# Patient Record
Sex: MALE | Race: BLACK OR AFRICAN AMERICAN
[De-identification: names, ages, dates, MRNs, and addresses within clinical notes are randomized per-mention and may not be internally consistent; named-entity substitution may affect disease eponyms.]

---

## 2017-06-26 ENCOUNTER — HOSPITAL ENCOUNTER (EMERGENCY)
Dept: HOSPITAL 62 - ER | Age: 62
Discharge: HOME | End: 2017-06-26
Payer: OTHER GOVERNMENT

## 2017-06-26 VITALS — DIASTOLIC BLOOD PRESSURE: 107 MMHG | SYSTOLIC BLOOD PRESSURE: 166 MMHG

## 2017-06-26 DIAGNOSIS — F17.210: ICD-10-CM

## 2017-06-26 DIAGNOSIS — Z71.6: ICD-10-CM

## 2017-06-26 DIAGNOSIS — I10: ICD-10-CM

## 2017-06-26 DIAGNOSIS — Z86.73: ICD-10-CM

## 2017-06-26 DIAGNOSIS — M51.16: Primary | ICD-10-CM

## 2017-06-26 DIAGNOSIS — L02.212: ICD-10-CM

## 2017-06-26 PROCEDURE — 87205 SMEAR GRAM STAIN: CPT

## 2017-06-26 PROCEDURE — 87077 CULTURE AEROBIC IDENTIFY: CPT

## 2017-06-26 PROCEDURE — 0H96XZZ DRAINAGE OF BACK SKIN, EXTERNAL APPROACH: ICD-10-PCS | Performed by: NURSE PRACTITIONER

## 2017-06-26 PROCEDURE — 99284 EMERGENCY DEPT VISIT MOD MDM: CPT

## 2017-06-26 PROCEDURE — 87070 CULTURE OTHR SPECIMN AEROBIC: CPT

## 2017-06-26 PROCEDURE — 72110 X-RAY EXAM L-2 SPINE 4/>VWS: CPT

## 2017-06-26 PROCEDURE — 87075 CULTR BACTERIA EXCEPT BLOOD: CPT

## 2017-06-26 NOTE — RADIOLOGY REPORT (SQ)
EXAM DESCRIPTION:  L SPINE WHOLE



COMPLETED DATE/TIME:  6/26/2017 12:21 pm



REASON FOR STUDY:  pain left hip and back



COMPARISON:  None.



NUMBER OF VIEWS:  Five views including obliques.



TECHNIQUE:  AP, lateral, oblique, and sacral radiographic images acquired of the lumbar spine.



LIMITATIONS:  None.



FINDINGS:  MINERALIZATION: Normal.

SEGMENTATION: L5 may represent a transitional vertebra.  There is sacralization of L5.

ALIGNMENT: There is grade 1 anterolisthesis of L4 on L5.

VERTEBRAE: Maintained height.  No fracture or worrisome bone lesion.

DISCS: There is narrowing of the L4-5 and L5-S1 disc spaces.

POSTERIOR ELEMENTS: Pedicles and facets are intact.  No pars defect or posterior arch defects.  Hyper
trophic facet joints are present at L4-5 and L5-S1.

HARDWARE: None in the spine.

PARASPINAL SOFT TISSUES: Normal.

PELVIS: Intact as visualized. No fractures or worrisome bone lesions. SI joints intact.

OTHER: No other significant finding.



IMPRESSION:  1.  L5 appears to represent a transitional vertebra.

2.  There is grade 1 anterolisthesis of L4 on L5.

3.  There are degenerative disc changes and facet arthropathy as described.



TECHNICAL DOCUMENTATION:  JOB ID:  [id number]

 2011 [employer]- All Rights Reserved

## 2017-06-26 NOTE — ER DOCUMENT REPORT
ED General





- General


Chief Complaint: Leg Pain


Stated Complaint: LEFT SIDE SHOULDER,LEG PAIN


Time Seen by Provider: 06/26/17 11:19


Mode of Arrival: Ambulatory


Information source: Patient


Notes: 


61-year-old male presents to ED for pain in his left lower back and down his 

left leg about a month progressively getting worse.  States he is having 

difficulty sitting upright due to the pain.  [facility] and forgot to tell them that he had this new pain.  He also has abscess 

to the posterior left upper back just medial to his shoulder which is causing 

him pain to his left shoulder and neck.  He states it has been there for 2-3 

week.  He states he did not tell the [facility] about this either when he was 

saw him last.  This patient has a previous history of a stroke in the left side 

in 2012 high blood pressure high cholesterol.


TRAVEL OUTSIDE OF THE [place] IN LAST 30 DAYS: No





- HPI


Onset: Other - A month or more


Onset/Duration: Gradual, Persistent


Quality of pain: Achy, Sharp, Throbbing


Severity: Severe


Pain Level: 5


Associated symptoms: Other - Left shoulder neck and arm pain left lower back 

and hip pain abscess to the left upper back just below the shoulder


Exacerbated by: Sitting, Movement, Walking


Relieved by: Denies


Similar symptoms previously: Yes


Recently seen / treated by doctor: Yes





- Related Data


Allergies/Adverse Reactions: 


 





No Known Allergies Allergy (Verified 06/26/17 10:22)


 











Past Medical History





- General


Information source: Patient





- Social History


Smoking Status: Current Every Day Smoker


Cigarette use (# per day): Yes - Smokes 2-3 cigars a day


Chew tobacco use (# tins/day): No


Smoking Education Provided: Yes - Less than 2 minutes


Frequency of alcohol use: None


Drug Abuse: None


Occupation: None


Lives with: Alone


Family History: Arthritis, DM, Hyperlipidemia, Hypertension.  denies: CAD, COPD

, CVA, Malignancy, Thyroid Disfunction


Patient has suicidal ideation: No


Patient has homicidal ideation: No





- Past Medical History


Cardiac Medical History: Reports: Hx Hypercholesterolemia, Hx Hypertension


Pulmonary Medical History: Reports: None


Neurological Medical History: Reports: Hx Cerebrovascular Accident - 2012, Hx 

Seizures - childhood


Renal/ Medical History: Reports: None


Malignancy Medical History: Reports None


GI Medical History: Reports: None


Musculoskeltal Medical History: Reports Hx Arthritis, Reports Hx Muscle 

Weakness - Left


Skin Medical History: Reports None


Psychiatric Medical History: Reports: None


Traumatic Medical History: Reports: None


Infectious Medical History: Reports: None


Surgical Hx: Negative


Past Surgical History: Reports: None





- Immunizations


Hx Diphtheria, Pertussis, Tetanus Vaccination: Yes





Review of Systems





- Review of Systems


Constitutional: No symptoms reported


EENT: No symptoms reported


Cardiovascular: No symptoms reported


Respiratory: No symptoms reported


Gastrointestinal: No symptoms reported


Genitourinary: No symptoms reported


Male Genitourinary: No symptoms reported


Musculoskeletal: Back pain, Joint pain - Hip, Muscle pain, Other - Left 

shoulder and left side of neck pain due to abscess just below the left shoulder 

and upper back


Skin: Other - Abscess left upper back


Hematologic/Lymphatic: No symptoms reported


Neurological/Psychological: No symptoms reported





Physical Exam





- Vital signs


Vitals: 


 











Temp Pulse Resp BP Pulse Ox


 


 98.3 F   73   20   166/107 H  99 


 


 06/26/17 10:23  06/26/17 10:23  06/26/17 10:23  06/26/17 10:23  06/26/17 10:23











Interpretation: Normal





- General


General appearance: Appears well, Alert





- HEENT


Head: Normocephalic, Atraumatic


Eyes: Normal


Pupils: PERRL





- Respiratory


Respiratory status: No respiratory distress


Chest status: Nontender


Breath sounds: Normal


Chest palpation: Normal





- Cardiovascular


Rhythm: Regular


Heart sounds: Normal auscultation


Murmur: No





- Abdominal


Inspection: Normal


Distension: No distension


Bowel sounds: Normal


Tenderness: Nontender


Organomegaly: No organomegaly





- Back


Back: Normal, Tender - Left side or back.  No: Deformity/step-off, CVA 

tenderness, Vertebra tenderness, Scars, Scoliosis, Wounds





- Extremities


General upper extremity: Normal inspection, Nontender, Normal color, Normal ROM

, Normal temperature


General lower extremity: Normal inspection, Normal color, Normal temperature, 

Normal weight bearing.  No: Frank's sign


Thigh: Tender.  No: Dislocation





- Neurological


Neuro grossly intact: Yes


Cognition: Normal


Orientation: AAOx4


Sebastián Coma Scale Eye Opening: Spontaneous


Mulberry Coma Scale Verbal: Oriented


Sebastián Coma Scale Motor: Obeys Commands


M[place]berry Coma Scale Total: 15


Speech: Normal


Motor strength normal: LUE, RUE, LLE, RLE


Sensory: Normal





- Psychological


Associated symptoms: Normal affect, Normal mood





- Skin


Skin Temperature: Warm


Skin Moisture: Dry


Skin Color: Normal


Skin irregularity: Abscess - Left upper back just below the left shoulder





Course





- Re-evaluation


Re-evalutation: 





06/26/17 13:59


Discussed x-rays with Dr. Briceño and patient.  [facility].  He was also treated for an abscess to the upper left back which was 

I&D.  Patient started on Bactrim and Keflex as well as given a Norco dispense 

pack for his discomfort.  [facility] concern is [facility] and 

his blood pressure.





- Vital Signs


Vital signs: 


 











Temp Pulse Resp BP Pulse Ox


 


 98.3 F   73   20   166/107 H  99 


 


 06/26/17 10:23  06/26/17 10:23  06/26/17 10:23  06/26/17 10:23  06/26/17 10:23














- Diagnostic Test


Radiology reviewed: Image reviewed, Reports reviewed





Procedures





- Incision and Drainage


  ** Left Upper Back


Type: Simple


Anesthetic type: 1% Lidocaine


mL's of anesthetic: 5


Blade size: 11


I&D procedure: Sterile dressing applied, Other - surgical scrub


Incision Method: Incision made by scalpel


Amount/type of drainage: Large amount of purulent drainage





Discharge





- Discharge


Clinical Impression: 


 abscess left upper back





Degenerative disc disease


Qualifiers:


 Spinal region: lumbar Qualified Code(s): M51.36 - Other intervertebral disc 

degeneration, lumbar region





Low back pain


Qualifiers:


 Chronicity: chronic Back pain laterality: left Sciatica presence: with 

sciatica Sciatica laterality: sciatica of left side Qualified Code(s): M54.42 - 

Lumbago with sciatica, left side; G89.29 - Other chronic pain





Condition: Stable


Disposition: HOME, SELF-CARE


Additional Instructions: 


LOW BACK PAIN:





     Three out of every four people will have an episode of disabling back pain 

during their lifetime.  Most commonly the pain is due to straining of the 

muscles and ligaments in the low back.


     Usual treatment includes: 


(1) Rest on a firm surface.  Avoid lying on your stomach.  


(2) Ice pack the painful area.  After a few days, gentle heat may be used 

intermittently to relax the area, or ice packs can be continued.  


(3) Medication may be needed -- muscle relaxers and antiinflammatory medicines 

are commonly used.  


(4) As the back improves, exercises are prescribed to strengthen the back and 

abdominal muscles.


     Your doctor will advise you on the proper care for your back at each stage 

in your recovery.  You may be better in a few days -- or healing may take 

several weeks.


     If new symptoms of a "herniated disc" (radiation of pain, numbness, or 

tingling down the back of the leg or weakness in the leg) occur, you should be 

re-examined.  Further testing may be necessary.





CELLULITIS:





     You have an infection of your skin and underlying soft tissues called 

cellulitis.  This is due to bacteria, which can enter through any break in the 

skin, or even through an irritated hair follicle.  Untreated, cellulitis will 

usually worsen.


     Antibiotics are required.  Usually, warm packs or warm soaks, and 

elevation of the infected area are recommended.  You should start getting 

better within 24 to 36 hours.


     Most infections respond quickly to the right medication. Follow-up care is 

important, however, to check for abscess (boil) formation, unsuspected foreign 

body, or resistant infection.


     If you develop fever, chills, or if the area of infection is becoming 

rapidly more swollen or painful, call the doctor at once.





Cephalexin





     The antibiotic you've been prescribed is a member of the cephalosporin 

class.  This type of antibiotic covers a wide variety of infections, including 

those of the skin, lungs, and urinary tract. It's useful for staph infections.


     This antibiotic is slightly similar to the penicillin family. In rare cases

, a person who is allergic to penicillin will also be allergic to this 

medication.  If you have had a severe allergic reaction to penicillin, and have 

not taken this antibiotic since that time, notify your doctor.


     Antibiotics which cover many germs ("broad spectrum" antibiotics) are more 

likely to cause diarrhea or "yeast" infections.  Women prone to vaginal yeast 

problems may suffer an attack after taking this antibiotic.  In infants, oral 

thrush (white spots "stuck" on the cheek) or yeast diaper rash may result.  See 

your doctor if these problems occur.  Call at once if you develop itching, hives

, shortness of breath, or lightheadedness.











TRIMETHOPRIM-SULFA:


     You have been given a prescription for trimethoprim-sulfa (TMS, Septra, 

Bactrim).  This is a combination antibiotic of the sulfa class, often used for 

urinary tract infections, middle ear infections, bronchitis, shigella 

intestinal infection, and Pneumocystis pneumonia.


     TMS is usually well-tolerated.  Occasional side effects include nausea and 

decreased appetite.  Septra is not recommended for infants less than two months 

of age.  Do not take this medication if you have experienced severe side 

effects or allergy to sulfa medicine.


     You should stop this medicine at once and contact your physician if you 

develop any rash, joint pain, shortness of breath, bruising, or jaundice (

yellow color in the skin), or if you develop any other new or unusual symptoms.











ORAL NARCOTIC MEDICATION:


     You have been given a prescription for pain control.  This medication is a 

narcotic.  It's best taken with food, as nausea can result if taken on an empty 

stomach.


     Don't operate machinery or drive within six hours of taking this 

medication.  Do not combine this medicine with alcohol, or with any medication 

which can cause sedation (such as cold tablets or sleeping pills) unless you 

get permission from the physician.


     Narcotics tend to cause constipation.  If possible, drink plenty of fluids 

and eat a diet high in fiber and fruits.





     Please be aware that prescription narcotics also have the potential for 

abuse.  People become addicted to these medications because of the general 

sense of wellbeing that they induce.  This feeling along with a significant 

reduction in tension, anxiety, and aggression provides a stimulating seductive 

quality to these drugs.  Once your pain is under control, we encourage you to 

discard your unused narcotics.





ICE PACKS:


     Apply ice packs frequently against the painful area.  Many different 

schedules are recommended, such as "20 minutes on, 20 minutes off" or "one hour 

ice, two hours rest."  If you need to work, you may need to go longer between 

ice treatments.  You should plan to have the area ice packed AT LEAST one 

fourth of the time.


     The ice should be applied over the wrap, tape, or splint, or over a layer 

of cloth -- not directly against the skin.  Some ice bags have a built-in cloth 

and can be put directly on the skin.





WARM PACKS:


     After approximately two days, apply gentle heat (such as a heating pad or 

hot water bottle) for about 20 to 30 minutes about every two hours -- at least 

four times daily.  Warmth and elevation will help you make a more rapid recovery

, and will ease the pain considerably.


     Do not use HOT heat, and never apply heat for longer than 30 minutes.  The 

continuous heat can invisibly damage skin and muscles -- even when no burn is 

seen on the surface.  Damaged muscles can make you MORE sore.








FOLLOW-UP CARE:


If you have been referred to a physician for follow-up care, call the physician

s office for an appointment as you were instructed or within the next two days.

  If you experience worsening or a significant change in your symptoms, notify 

the physician immediately or return to the Emergency Department at any time for 

re-evaluation.








[facility] your chronic [facility] pain, blood pressure, and 

your abscess.


Prescriptions: 


Cephalexin Monohydrate [Keflex 500 mg Capsule] 500 mg PO QID #20 capsule


Sulfamethoxazole/Trimethoprim [Septra-Ds 800-160 mg Tablet] 1 tab PO BID #14 

tablet


Forms:  Elevated Blood Pressure, Smoking Cessation Education

## 2017-06-26 NOTE — RADIOLOGY REPORT (SQ)
EXAM DESCRIPTION:  HIP LEFT AP/LATERAL



COMPLETED DATE/TIME:  6/26/2017 12:21 pm



REASON FOR STUDY:  pain left hip and back



COMPARISON:  None.



NUMBER OF VIEWS:  Two views.



TECHNIQUE:  AP pelvis and additional frog-leg view of the left hip.



LIMITATIONS:  None.



FINDINGS:  MINERALIZATION: Normal.

LEFT HIP: No fracture or dislocation.  No worrisome bone lesions.

RIGHT HIP: No fracture or dislocation.  No worrisome bone lesions.

PUBIS AND ISCHIUM: No fracture.

PELVIS: No fracture.

SACRUM: No fracture or dislocation. No worrisome bone lesions.

LOWER LUMBAR SPINE: Degenerative changes see the report for the lumbar spine series.

SOFT TISSUES: No findings.

OTHER: No other significant finding.



IMPRESSION:  Normal hip with lumbar degenerative changes.



TECHNICAL DOCUMENTATION:  JOB ID:  [id number]

 2011 [employer]- All Rights Reserved

## 2017-07-15 ENCOUNTER — HOSPITAL ENCOUNTER (EMERGENCY)
Dept: HOSPITAL 62 - ER | Age: 62
Discharge: HOME | End: 2017-07-15
Payer: OTHER GOVERNMENT

## 2017-07-15 VITALS — DIASTOLIC BLOOD PRESSURE: 86 MMHG | SYSTOLIC BLOOD PRESSURE: 153 MMHG

## 2017-07-15 DIAGNOSIS — I10: ICD-10-CM
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
## 2017-07-15 DIAGNOSIS — E11.9: ICD-10-CM

## 2017-07-15 DIAGNOSIS — S90.32XA: Primary | ICD-10-CM

## 2017-07-15 DIAGNOSIS — I69.354: ICD-10-CM

## 2017-07-15 PROCEDURE — 99283 EMERGENCY DEPT VISIT LOW MDM: CPT

## 2017-07-15 NOTE — RADIOLOGY REPORT (SQ)
EXAM DESCRIPTION:  FOOT LEFT COMPLETE



COMPLETED DATE/TIME:  7/15/2017 2:35 pm



REASON FOR STUDY:  foot inj slammed in door



COMPARISON:  2012.



NUMBER OF VIEWS:  Three views left foot.



LIMITATIONS:  None.



FINDINGS:  There is no acute or significant bone, joint or soft tissue abnormality.

OTHER: Osteopenic.



IMPRESSION:  No fracture appreciated.



TECHNICAL DOCUMENTATION:  JOB ID:  [id number]

## 2017-07-15 NOTE — ER DOCUMENT REPORT
ED General





- General


Chief Complaint: Foot Injury


Stated Complaint: FOOT INJURY


Notes: 


P1-year-old man with multiple comorbidities and a left-sided stroke presents 

with pain on the dorsum of his left foot for 3 weeks since he slammed the door, 

worse with walking, radiating up his leg initially associated with swelling 

which is now resolved.  He is taking nothing for the pain.


TRAVEL OUTSIDE OF THE [place] IN LAST 30 DAYS: No





- Related Data


Allergies/Adverse Reactions: 


 





No Known Allergies Allergy (Verified 07/15/17 13:34)


 











Past Medical History





- General


Information source: Patient - Reviewed





- Social History


Smoking Status: Former Smoker


Family History: Arthritis, DM, Hyperlipidemia, Hypertension.  denies: CAD, COPD

, CVA, Malignancy, Thyroid Disfunction


Patient has suicidal ideation: No


Patient has homicidal ideation: No





- Past Medical History


Cardiac Medical History: Reports: Hx Hypercholesterolemia, Hx Hypertension


Neurological Medical History: Reports: Hx Cerebrovascular Accident - 2012, Hx 

Seizures - childhood


Renal/ Medical History: Denies: Hx Peritoneal Dialysis


Musculoskeltal Medical History: Reports Hx Arthritis, Reports Hx Muscle 

Weakness - Left





- Immunizations


Hx Diphtheria, Pertussis, Tetanus Vaccination: Yes





Review of Systems





- Review of Systems


Notes: 


REVIEW OF SYSTEMS





GEN: Denies fever, chills, weight loss


ENT: Denies sore throat, nasal discharge, ear pain


EYES: Denies blurry vision, eye pain, discharge


CV: Denies chest pain, palpitations, edema


RESP: Denies cough, shortness of breath, wheezing


GI: Denies abdominal pain, nausea, vomiting, diarrhea


MSK: Denies joint pain/swelling, edema, dorsal foot pain


SKIN: Denies rash, skin lesions


LYMPH: Denies swollen glands/lymph nodes


NEURO: D old stroke left-sided weakness


PSYCH: Denies depression, suicidal or homicidal ideation








PHYSICAL EXAMINATION





General: No acute distress, well-nourished


Head: Atraumatic, normocephalic


ENT: Mouth normal, oropharynx moist, no exudates or tonsillar enlargement


Eyes: Conjunctiva normal, pupils equal, lids normal


Neck: No JVD, supple, no guarding


CVS: Normal rate, regular rhythm, no murmurs


Resp: No resp distress, equal and normal breath sounds bilaterally


GI: Nondistended, soft, no tenderness to palpation, no rebound or guarding


Ext: No deformities, no edema, normal range of motion in upper and lower ext.  

No swelling or ecchymosis of the foot with mild dorsal foot tenderness.


Back: No CVA or midline TTP


Skin: No rash, warm


Lymphatic: No lymphadeopathy noted


Neuro: Awake, alert.  Face symmetric.  GCS 15.  Left-sided contractures and 

muscle wasting upper greater than lower extremity.





Physical Exam





- Vital signs


Vitals: 


 











Temp Pulse Resp BP Pulse Ox


 


 98.4 F   70   18   141/92 H  100 


 


 07/15/17 13:34  07/15/17 13:34  07/15/17 13:34  07/15/17 13:34  07/15/17 13:34














Course





- Re-evaluation


Re-evalutation: 





07/15/17 14:20


Subacute foot pain after slamming a door.  No apparent injury.  Given patient's 

a diabetic with a stroke I will x-ray his foot to rule out fracture in a 

patient with abnormal sensorium.  He will then be given anti-inflammatory 

medication and discharged home.





- Vital Signs


Vital signs: 


 











Temp Pulse Resp BP Pulse Ox


 


 98.4 F   70   18   141/92 H  100 


 


 07/15/17 13:34  07/15/17 13:34  07/15/17 13:34  07/15/17 13:34  07/15/17 13:34














- Diagnostic Test


Radiology reviewed: Pending, Image reviewed, Reports reviewed





Discharge





- Discharge


Clinical Impression: 


Contusion of left foot


Qualifiers:


 Encounter type: initial encounter Qualified Code(s): S90.32XA - Contusion of 

left foot, initial encounter





Condition: Good


Instructions:  Contusion ([facility])


Prescriptions: 


Ibuprofen [Motrin 600 Mg Tablet] 600 mg PO TID #15 tablet

## 2017-07-21 ENCOUNTER — HOSPITAL ENCOUNTER (EMERGENCY)
Dept: HOSPITAL 62 - ER | Age: 62
Discharge: HOME | End: 2017-07-21
Payer: OTHER GOVERNMENT

## 2017-07-21 VITALS — DIASTOLIC BLOOD PRESSURE: 101 MMHG | SYSTOLIC BLOOD PRESSURE: 185 MMHG

## 2017-07-21 DIAGNOSIS — Z86.73: ICD-10-CM

## 2017-07-21 DIAGNOSIS — M25.562: ICD-10-CM

## 2017-07-21 DIAGNOSIS — M25.462: Primary | ICD-10-CM

## 2017-07-21 DIAGNOSIS — E78.00: ICD-10-CM

## 2017-07-21 DIAGNOSIS — F17.210: ICD-10-CM

## 2017-07-21 DIAGNOSIS — M79.1: ICD-10-CM

## 2017-07-21 DIAGNOSIS — I10: ICD-10-CM

## 2017-07-21 PROCEDURE — 99283 EMERGENCY DEPT VISIT LOW MDM: CPT

## 2017-07-21 PROCEDURE — 96372 THER/PROPH/DIAG INJ SC/IM: CPT

## 2017-07-21 NOTE — ER DOCUMENT REPORT
ED General





- General


Chief Complaint: Knee Pain


Stated Complaint: KNOT ON LEFT KNEE


Time Seen by Provider: 07/21/17 12:38


Mode of Arrival: Ambulatory


Information source: Patient


Notes: 


61-year-old male presents with complaints of left knee pain and swelling.  

Patient denies any trauma, notes the swelling has worsened over the past few 

weeks.  Patient denies any previous knee abnormalities, denies any fevers or 

chills


TRAVEL OUTSIDE OF THE [place] IN LAST 30 DAYS: No





- HPI


Onset: Other - 2 weeks


Onset/Duration: Persistent


Quality of pain: Achy


Severity: Mild


Pain Level: 1


Associated symptoms: Body/muscle aches


Exacerbated by: Movement, Walking


Relieved by: Denies


Similar symptoms previously: No


Recently seen / treated by doctor: No





- Related Data


Allergies/Adverse Reactions: 


 





No Known Allergies Allergy (Verified 07/21/17 11:52)


 











Past Medical History





- Social History


Smoking Status: Current Some Day Smoker


Cigarette use (# per day): Yes


Chew tobacco use (# tins/day): No


Smoking Education Provided: No


Frequency of alcohol use: None


Drug Abuse: None


Family History: Arthritis, DM, Hyperlipidemia, Hypertension.  denies: CAD, COPD

, CVA, Malignancy, Thyroid Disfunction





- Past Medical History


Cardiac Medical History: Reports: Hx Hypercholesterolemia, Hx Hypertension


Neurological Medical History: Reports: Hx Cerebrovascular Accident - 2012, Hx 

Seizures - childhood


Renal/ Medical History: Denies: Hx Peritoneal Dialysis


Musculoskeltal Medical History: Reports Hx Arthritis, Reports Hx Muscle 

Weakness - Left





- Immunizations


Hx Diphtheria, Pertussis, Tetanus Vaccination: Yes





Review of Systems





- Review of Systems


Notes: 


REVIEW OF SYSTEMS:


CONSTITUTIONAL :  Denies fever,  chills, or sweats.  Denies recent illness.


EENT:   Denies eye, ear, throat, or mouth pain or symptoms.  Denies nasal or 

sinus congestion or discharge.  Denies throat, tongue, or mouth swelling or 

difficulty swallowing.


CARDIOVASCULAR:  Denies chest pain.  Denies palpitations or racing or irregular 

heart beat.  Denies ankle edema.


RESPIRATORY:  Denies cough, cold, or chest congestion.  Denies shortness of 

breath, difficulty breathing, or wheezing.


GASTROINTESTINAL:  Denies abdominal pain or distention.  Denies nausea, vomiting

, or diarrhea.  Denies blood in vomitus, stools, or per rectum.  Denies black, 

tarry stools.  Denies constipation.  


GENITOURINARY:  Denies difficulty urinating, painful urination, burning, 

frequency, blood in urine, or discharge.


MUSCULOSKELETAL: Admits to left knee pain swelling


SKIN:   Denies rash, lesions or sores.


HEMATOLOGIC :   Denies easy bruising or bleeding.


LYMPHATIC:  Denies swollen, enlarged glands.


NEUROLOGICAL:  Denies confusion or altered mental status.  Denies passing out 

or loss of consciousness.  Denies dizziness or lightheadedness.  Denies 

headache.  Denies weakness or paralysis or loss of use of either side.  Denies 

problems with gait or speech.  Denies sensory loss, numbness, or tingling.  

Denies seizures.


PSYCHIATRIC:  Denies anxiety or stress.  Denies depression, suicidal ideation, 

or homicidal ideation.





ALL OTHER SYSTEMS REVIEWED AND NEGATIVE.





Dictation was performed using Dragon voice recognition software 





PHYSICAL EXAMINATION:





GENERAL: Well-appearing, well-nourished and in no acute distress.





HEAD: Atraumatic, normocephalic.





EYES: Pupils equal round and reactive to light, extraocular movements intact, 

sclera anicteric, conjunctiva are normal.





ENT: Nares patent, oropharynx clear without exudates.  Moist mucous membranes.





NECK: Normal range of motion, supple without lymphadenopathy





LUNGS: Breath sounds clear to auscultation bilaterally and equal.  No wheezes 

rales or rhonchi.





HEART: Regular rate and rhythm without murmurs





ABDOMEN: Soft, nontender, nondistended abdomen.  No guarding, no rebound.  No 

masses appreciated.





Musculoskeletal: left knee mild effusion noted, no bony tenderness 





NEUROLOGICAL: Cranial nerves grossly intact.  Normal speech, normal gait.  

Normal sensory, motor exams 





PSYCH: Normal mood, normal affect.





SKIN: Warm, Dry, normal turgor, no rashes or lesions noted.





Physical Exam





- Vital signs


Vitals: 





 











Temp Pulse Resp BP Pulse Ox


 


 98.5 F   65   16   174/92 H  100 


 


 07/21/17 11:53  07/21/17 11:53  07/21/17 11:53  07/21/17 11:53  07/21/17 11:53














Course





- Re-evaluation


Re-evalutation: 





07/21/17 12:53


Patient will be given Ace wrap anti-inflammatories and follow-up with 

orthopedics otherwise looks well is in no distress


I did offer the patient aspiration of the knee, explained risks and benefits 

and he wishes to defer at this time








After performing a Medical Screening Examination, I estimate there is LOW risk 

for INTRACRANIAL HEMORRHAGE, UNSTABLE SPINE FRACTURE, CENTRAL CORD SYNDROME, 

CAUDA EQUINA, THORACIC AORTIC DISSECTION, PNEUMOTHORAX, PERFORATED BOWEL, 

RUPTURED ABDOMINAL AORTIC ANEURYSM, ACUTE TENDON RUPTURE, COMPARTMENT SYNDROME, 

or OPEN FRACTURE, thus I consider the discharge disposition reasonable. Also, 

there is no evidence or peritonitis, sepsis, or toxicity.  I have reevaluated 

this patient multiple times and no significant life threatening changes are 

noted. The patient and I have discussed the diagnosis and risks, and we agree 

with discharging home to follow-up with their primary doctor with the 

understanding that symptoms and presentations can change. We also discussed 

returning to the Emergency Department immediately if new or worsening symptoms 

occur. We have discussed the symptoms which are most concerning (e.g., bloody 

stool, fever, changing or worsening pain, vomiting) that necessitate immediate 

return.





- Vital Signs


Vital signs: 





 











Temp Pulse Resp BP Pulse Ox


 


 98.5 F   65   16   174/92 H  100 


 


 07/21/17 11:53  07/21/17 11:53  07/21/17 11:53  07/21/17 11:53  07/21/17 11:53














Discharge





- Discharge


Clinical Impression: 


 Effusion, left knee





Left knee pain


Qualifiers:


 Chronicity: acute Qualified Code(s): M25.562 - Pain in left knee





Hypertension


Qualifiers:


 Hypertension type: essential hypertension Qualified Code(s): I10 - Essential (

primary) hypertension





Condition: Stable


Disposition: HOME, SELF-CARE


Instructions:  Suspected Internal Knee Injury ([facility])


Prescriptions: 


Naproxen 500 mg PO BID #20 tablet


Referrals: 


MARTITA HUDSON MD [ACTIVE STAFF] - Follow up in 1 week

## 2017-07-28 ENCOUNTER — HOSPITAL ENCOUNTER (EMERGENCY)
Dept: HOSPITAL 62 - ER | Age: 62
Discharge: HOME | End: 2017-07-28
Payer: MEDICARE

## 2017-07-28 VITALS — DIASTOLIC BLOOD PRESSURE: 96 MMHG | SYSTOLIC BLOOD PRESSURE: 161 MMHG

## 2017-07-28 DIAGNOSIS — I10: ICD-10-CM

## 2017-07-28 DIAGNOSIS — F17.210: ICD-10-CM

## 2017-07-28 DIAGNOSIS — M25.562: Primary | ICD-10-CM

## 2017-07-28 PROCEDURE — 99283 EMERGENCY DEPT VISIT LOW MDM: CPT

## 2017-07-28 PROCEDURE — 96372 THER/PROPH/DIAG INJ SC/IM: CPT

## 2017-07-28 PROCEDURE — 73562 X-RAY EXAM OF KNEE 3: CPT

## 2017-07-28 NOTE — ER DOCUMENT REPORT
HPI





- HPI


Patient complains to provider of: left knee pain


Onset: Other


Onset/Duration: Gradual


Quality of pain: Throbbing


Severity: Severe


Pain Level: 5


Context: 


Patient states he has been having left knee pain for almost 1 month.  States he 

had some fluid in his knee and was seen here several days ago and nothing was 

done for him.  The pain medication they gave him is not helping.  He did not 

call Dr. Pearson's office for follow-up as instructed.


Associated Symptoms: None


Exacerbated by: Movement, Walking


Relieved by: Denies


Similar symptoms previously: Yes


Recently seen / treated by doctor: Yes





- ROS


ROS below otherwise negative: Yes


Systems Reviewed and Negative: Yes All other systems reviewed and negative





- CONSTITUTIONAL


Constitutional: DENIES: Fever





- EENT


EENT: DENIES: Congestion





- NEURO


Neurology: DENIES: Headache





- CARDIOVASCULAR


Cardiovascular: DENIES: Chest pain





- RESPIRATORY


Respiratory: DENIES: Trouble Breathing





- GASTROINTESTINAL


Gastrointestinal: DENIES: Abdominal Pain





- MUSCULOSKELETAL


Musculoskeletal: REPORTS: Extremity pain - left knee





- DERM


Skin Color: Normal


Skin Problems: None





Past Medical History





- General


Information source: Patient





- Social History


Smoking Status: Current Every Day Smoker


Cigarette use (# per day): Yes


Frequency of alcohol use: None


Drug Abuse: None


Lives with: Family


Family History: Arthritis, DM, Hyperlipidemia, Hypertension





- Past Medical History


Cardiac Medical History: Reports: Hx Hypercholesterolemia, Hx Hypertension


Neurological Medical History: Reports: Hx Cerebrovascular Accident - 2012, Hx 

Seizures - childhood


Renal/ Medical History: Denies: Hx Peritoneal Dialysis


Musculoskeltal Medical History: Reports Hx Arthritis, Reports Hx Muscle 

Weakness - Left


Surgical Hx: Negative





- Immunizations


Hx Diphtheria, Pertussis, Tetanus Vaccination: Yes





Vertical Provider Document





- CONSTITUTIONAL


Agree With Documented VS: Yes


Exam Limitations: No Limitations


General Appearance: WD/WN, No Apparent Distress





- INFECTION CONTROL


TRAVEL OUTSIDE OF THE [place] IN LAST 30 DAYS: No





- HEENT


HEENT: Atraumatic, Normocephalic





- RESPIRATORY


Respiratory: Breath Sounds Normal, No Respiratory Distress


O2 Sat by Pulse Oximetry: 99





- CARDIOVASCULAR


Cardiovascular: Regular Rate, Regular Rhythm





- GI/ABDOMEN


Gastrointestinal: Abdomen Soft





- MUSCULOSKELETAL/EXTREMETIES


Musculoskeletal/Extremeties: MAEW, Tender, No Edema - left knee





- NEURO


Level of Consciousness: Awake, Alert, Appropriate





- DERM


Integumentary: Warm, Dry





Course





- Re-evaluation


Re-evalutation: 


07/28/17 11:48


X-ray showed no acute abnormality, but there is subperiosteal new bone in the 

proximal fibula.  Patient denies history of fracture.  Patient is instructed to 

follow-up with Dr. Pearson for further evaluation of knee pain and x-ray results.





07/28/17 11:51


Patient states some relief of pain after the Toradol injection





- Vital Signs


Vital signs: 


 











Temp Pulse Resp BP Pulse Ox


 


 98.9 F   62   18   161/96 H  99 


 


 07/28/17 10:22  07/28/17 10:22  07/28/17 10:22  07/28/17 10:22  07/28/17 10:22














Discharge





- Discharge


Clinical Impression: 


Left knee pain


Qualifiers:


 Chronicity: acute Qualified Code(s): M25.562 - Pain in left knee





Condition: Good


Disposition: HOME, SELF-CARE


Additional Instructions: 


Meds as prescribed


Ice packs


continue using your cane for walking.


Call Dr. Pearson's office for follow-up appointment, he will be able to look at 

your x-rays from his office.


Return as needed


Prescriptions: 


Ketorolac Tromethamine [Toradol 10 mg Tablet] 10 mg PO Q6HP PRN #20 tablet


 PRN Reason: 


Referrals: 


MARTITA HUDSON MD [ACTIVE STAFF] - Follow up as needed

## 2017-07-28 NOTE — RADIOLOGY REPORT (SQ)
EXAM DESCRIPTION:  KNEE LEFT 4 VIEW



COMPLETED DATE/TIME:  7/28/2017 11:07 am



REASON FOR STUDY:  pain



COMPARISON:  None.



NUMBER OF VIEWS:  Four views.



TECHNIQUE:  AP, lateral, and both oblique radiographic images acquired of the left knee.



LIMITATIONS:  None.



FINDINGS:  MINERALIZATION: Osteopenia

BONES: There is subperiosteal new bone in the proximal fibula.  No acute fracture or dislocation is p
resent.

JOINT: No effusion.

SOFT TISSUES: No soft tissue swelling.  No radio-opaque foreign body.

OTHER: No other significant finding.



IMPRESSION:  1.  There is no acute abnormality.

2.  There is subperiosteal new bone in the proximal fibula.  Is there history of fracture?  Consider 
MRI for further evaluation if clinically indicated.



TECHNICAL DOCUMENTATION:  JOB ID:  [id number]

 2011 [employer]- All Rights Reserved

## 2017-07-30 ENCOUNTER — HOSPITAL ENCOUNTER (EMERGENCY)
Dept: HOSPITAL 62 - ER | Age: 62
Discharge: HOME | End: 2017-07-30
Payer: MEDICARE

## 2017-07-30 VITALS — DIASTOLIC BLOOD PRESSURE: 61 MMHG | SYSTOLIC BLOOD PRESSURE: 180 MMHG

## 2017-07-30 DIAGNOSIS — I10: ICD-10-CM

## 2017-07-30 DIAGNOSIS — M25.562: Primary | ICD-10-CM

## 2017-07-30 PROCEDURE — 96372 THER/PROPH/DIAG INJ SC/IM: CPT

## 2017-07-30 PROCEDURE — 99283 EMERGENCY DEPT VISIT LOW MDM: CPT

## 2017-07-30 NOTE — ER DOCUMENT REPORT
ED Extremity Problem, Lower





- General


Chief Complaint: Leg Pain


Stated Complaint: LEFT LEG PAIN


Time Seen by Provider: 07/30/17 11:31


Notes: 


Patient is a 61-year-old male who returns emergency department complaining of 

left knee pain.  Patient states that he has had this pain for approximately 1 

month.  States he has been seen multiple times with department he states he has 

had nothing done for him.  Patient states that he received Toradol last time 

and that helped with his pain otherwise he states he is due to follow-up with 

Dr. Paerson on Thursday. He denies any new trauma or fall. Pain described as 

aching pain with radiation down the front of his leg





Review of his previous medical records showed that he has been offered a joint 

aspiration for the fluid in his knee but he declined.  Otherwise he has had 

multiple imaging of his extremities without any evidence of acute fracture.  

Does show evidence of subperiosteal new bone in the proximal fibula.  Patient 

denies history of fracture.  Patient states that





Last time he had anything for pain with his Toradol injection 2 days ago.  

Patient states that he has not been taking any over-the-counter medications nor 

did he fill the prescription that he received here previously.





PMH; stroke with residual LUE weakness, HTN.


[facility]


TRAVEL OUTSIDE OF THE [place] IN LAST 30 DAYS: No





- Related Data


Allergies/Adverse Reactions: 


 





No Known Allergies Allergy (Verified 07/30/17 11:19)


 











Past Medical History





- Social History


Smoking Status: Unknown if Ever Smoked


Family History: Arthritis, DM, Hyperlipidemia, Hypertension





- Past Medical History


Cardiac Medical History: Reports: Hx Hypercholesterolemia, Hx Hypertension


Neurological Medical History: Reports: Hx Cerebrovascular Accident - 2012, Hx 

Seizures - childhood


Renal/ Medical History: Denies: Hx Peritoneal Dialysis


Musculoskeltal Medical History: Reports Hx Arthritis, Reports Hx Muscle 

Weakness - Left





- Immunizations


Hx Diphtheria, Pertussis, Tetanus Vaccination: Yes





Review of Systems





- Review of Systems


Constitutional: No symptoms reported


Musculoskeletal: See HPI


Skin: No symptoms reported


Neurological/Psychological: No symptoms reported


-: Yes All other systems reviewed and negative





Physical Exam





- Vital signs


Vitals: 


 











Temp Pulse Resp BP Pulse Ox


 


 98.5 F   63   16   180/61 H  98 


 


 07/30/17 11:18  07/30/17 11:18  07/30/17 11:18  07/30/17 11:18  07/30/17 11:18














- General


General appearance: Appears well, Alert


In distress: None





- Cardiovascular


Pulses: Normal: Femoral, Popliteal, Dorsalis pedis


Normal capillary refill: Yes





- Extremities


Hip: Normal, Nontender.  No: Pain with ROM, Unable to bear weight


Thigh: Normal, Nontender.  No: Unable to bear weight


Knee: Normal, Nontender, Pain with ROM.  No: Deformity, Dislocation, Drawer's 

test instability, Joint effusion, Tender joint line, Unable to bear weight


Calf: Normal, Nontender


Ankle: Normal, Nontender.  No: Edema


Foot: Normal, Nontender.  No: Abrasion, Deformity, Unable to bear weight





- Neurological


Neuro grossly intact: Yes


Cognition: Normal


Orientation: AAOx4


Sebastián Coma Scale Eye Opening: Spontaneous


[place] Coma Scale Verbal: Oriented


Sebastián Coma Scale Motor: Obeys Commands


[place] Coma Scale Total: 15


Speech: Normal


Motor strength normal: RUE, LLE, RLE





- Skin


Skin Temperature: Warm


Skin Moisture: Dry


Skin Color: Normal


Skin Turgor: Elastic





Course





- Re-evaluation


Re-evalutation: 





07/30/17 13:09


Patient is a 61-year-old male who is hemodynamic stable, no acute distress and 

afebrile.  Gait stable and able to bear weight without any difficulty.  Patient 

has been seen multiple times for this chronic complaint.  Patient educated on 

taking anti-inflammatories to help with his pain and to follow-up with Dr. Miller on Thursday.  Patient expressed understanding and agrees with plan.  

Given patient has no new injury or trauma no additional imaging was ordered.





- Vital Signs


Vital signs: 


 











Temp Pulse Resp BP Pulse Ox


 


 98.5 F   63   16   180/61 H  98 


 


 07/30/17 11:18  07/30/17 11:18  07/30/17 11:18  07/30/17 11:18  07/30/17 11:18














Discharge





- Discharge


Clinical Impression: 


Left knee pain


Qualifiers:


 Chronicity: acute Qualified Code(s): M25.562 - Pain in left knee





Condition: Good


Disposition: HOME, SELF-CARE


Additional Instructions: 


Your complaint today is chronic and you will need to follow-up with Dr. Pearson 

in his office.


Please use ice and heat as tolerated to help with your pain


You can also buy capsaicin over-the-counter to help with your pain


Please take your naproxen as prescribed.


Prescriptions: 


Naproxen 500 mg PO BID #20 tablet


Forms:  Elevated Blood Pressure


Referrals: 


MARTITA HUDSON MD [ACTIVE STAFF] - Follow up in 3-5 days

## 2018-01-11 ENCOUNTER — HOSPITAL ENCOUNTER (EMERGENCY)
Dept: HOSPITAL 62 - ER | Age: 63
Discharge: HOME | End: 2018-01-11
Payer: MEDICARE

## 2018-01-11 VITALS — DIASTOLIC BLOOD PRESSURE: 98 MMHG | SYSTOLIC BLOOD PRESSURE: 165 MMHG

## 2018-01-11 DIAGNOSIS — E78.00: ICD-10-CM

## 2018-01-11 DIAGNOSIS — Z86.74: ICD-10-CM

## 2018-01-11 DIAGNOSIS — I10: ICD-10-CM
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
## 2018-01-11 DIAGNOSIS — M79.605: Primary | ICD-10-CM

## 2018-01-11 PROCEDURE — 73552 X-RAY EXAM OF FEMUR 2/>: CPT

## 2018-01-11 PROCEDURE — 73590 X-RAY EXAM OF LOWER LEG: CPT

## 2018-01-11 PROCEDURE — 99283 EMERGENCY DEPT VISIT LOW MDM: CPT

## 2018-01-11 NOTE — ER DOCUMENT REPORT
ED Extremity Problem, Lower





- General


Chief Complaint: Fall


Stated Complaint: FALL/LEFT SIDE PAIN


Time Seen by Provider: 01/11/18 15:50


Mode of Arrival: Wheelchair


Information source: Patient


Notes: 





62-year-old male presents to ED for complaint of left leg pain from his hip to 

his foot.  He states he has had this pain off and on for 4 months and is seeing 

Dr. Pearson for this pain who has had him on meloxicam and sending him to 

physical therapy.  He states he fell down some steps a couple days ago but this 

is not a new pain this is the same pain that he has had for 4 months.  There is 

no bruises no signs of any acute injuries to this leg.


TRAVEL OUTSIDE OF THE [place] IN LAST 30 DAYS: No





- HPI


Patient complains to provider of: Pain.  No: Swelling


Location: Leg, Thigh


Occurred: Other - 4 months states he fell a couple days ago but the pain is no 

different than it has been for 4 months


Where: Home, Indoors


Quality of pain: Achy, Dull


Severity: Severe


Pain Level: 5


Context: Fell


Recent injury: Possibly


Associated symptoms: Painful ambulation


Exacerbated by: Movement, Walking


Relieved by: Nothing





- Related Data


Allergies/Adverse Reactions: 


 





No Known Allergies Allergy (Verified 01/11/18 13:47)


 











Past Medical History





- General


Information source: Patient





- Social History


Smoking Status: Never Smoker


Cigarette use (# per day): No


Chew tobacco use (# tins/day): No


Smoking Education Provided: No


Frequency of alcohol use: None


Drug Abuse: None


Family History: Arthritis, DM, Hyperlipidemia, Hypertension


Patient has suicidal ideation: No


Patient has homicidal ideation: No





- Past Medical History


Cardiac Medical History: Reports: Hx Hypercholesterolemia, Hx Hypertension


EENT Medical History: Reports: None


Neurological Medical History: Reports: Hx Cerebrovascular Accident - 2012, Hx 

Seizures - childhood


Endocrine Medical History: Reports: None


Renal/ Medical History: Reports: None


Malignancy Medical History: Reports None


GI Medical History: Reports: None


Musculoskeltal Medical History: Reports Hx Arthritis, Reports Hx Muscle 

Weakness - Left


Skin Medical History: Reports None


Psychiatric Medical History: Reports: None


Traumatic Medical History: Reports: None


Infectious Medical History: Reports: None


Surgical Hx: Negative


Past Surgical History: Reports: None





- Immunizations


Hx Diphtheria, Pertussis, Tetanus Vaccination: Yes





Review of Systems





- Review of Systems


Constitutional: No symptoms reported


EENT: No symptoms reported


Cardiovascular: No symptoms reported


Respiratory: No symptoms reported


Gastrointestinal: No symptoms reported


Genitourinary: No symptoms reported


Male Genitourinary: No symptoms reported


Musculoskeletal: Muscle pain, Other - left leg pain for 4 months no brusing 

states pain is the same as it has been for 4 months


Skin: No symptoms reported


Hematologic/Lymphatic: No symptoms reported


Neurological/Psychological: Weakness - left leg and arm, Other - left leg pain 

chronic


-: Yes All other systems reviewed and negative





Physical Exam





- Vital signs


Vitals: 


 











Temp Pulse Resp BP Pulse Ox


 


 98.4 F   82   14   161/99 H  97 


 


 01/11/18 14:07  01/11/18 14:07  01/11/18 14:07  01/11/18 14:07  01/11/18 14:07











Interpretation: Normal





- General


General appearance: Appears well, Alert





- HEENT


Head: Normocephalic, Atraumatic


Eyes: Normal


Pupils: PERRL





- Respiratory


Respiratory status: No respiratory distress


Chest status: Nontender


Breath sounds: Normal


Chest palpation: Normal





- Cardiovascular


Rhythm: Regular


Heart sounds: Normal auscultation


Murmur: No





- Abdominal


Inspection: Normal


Distension: No distension


Bowel sounds: Normal


Tenderness: Nontender


Organomegaly: No organomegaly





- Back


Back: Normal, Nontender





- Extremities


General upper extremity: Normal inspection, Nontender, Normal color, Normal ROM

, Normal temperature, Other - Left arm weakness due to previous stroke


General lower extremity: Normal inspection, Normal color, Normal ROM, Normal 

temperature, Normal weight bearing, Other - Left leg weakness due to previous 

strokes.  No: Frank's sign


Thigh: Tender.  No: Abrasion, Deformity, Dislocation, Ecchymosis, Instability, 

Laceration, Unable to bear weight


Knee: Tender, Patellar tendon intact.  No: Abrasion, Deformity, Dislocation, 

Drawer's test instability, Ecchymosis, Instability, Joint effusion, Laceration, 

Laxity with valgus stress, Laxity with varus stress, Pain with ROM, Popliteal 

fossa tender, Tender joint line, Unable to bear weight


Calf: Tender.  No: Abrasion, Deformity, Ecchymosis, Instability, Laceration, 

Unable to bear weight


Ankle: Tender.  No: Abrasion, Deformity, Ecchymosis, Edema, Instability, 

Laceration, Limited ROM, Positive Hu's test, Unable to bear weight


Foot: No evidence of FB





- Neurological


Neuro grossly intact: Yes


Cognition: Normal


Orientation: AAOx4


Sebastián Coma Scale Eye Opening: Spontaneous


Sebastián Coma Scale Verbal: Oriented


[place] Coma Scale Motor: Obeys Commands


[place] Coma Scale Total: 15


Speech: Normal


Motor strength normal: LUE, RUE, LLE, RLE


Sensory: Normal





- Psychological


Associated symptoms: Normal affect, Normal mood





- Skin


Skin Temperature: Warm


Skin Moisture: Dry


Skin Color: Normal





Course





- Re-evaluation


Re-evalutation: 





01/11/18 18:23


X-rays discussed with patient.  Written reports of x-rays given the patient for 

follow-up with Dr[facility] and [facility].  Patient encouraged to continue 

taking his meloxicam as ordered and to continue his physical therapy as 

ordered.  Warm compresses and ice compresses instructed to patient.





- Vital Signs


Vital signs: 


 











Temp Pulse Resp BP Pulse Ox


 


 98.1 F   83   18   165/98 H  98 


 


 01/11/18 18:47  01/11/18 18:47  01/11/18 18:47  01/11/18 18:47  01/11/18 18:47














- Diagnostic Test


Radiology reviewed: Image reviewed, Reports reviewed





Discharge





- Discharge


Clinical Impression: 


 Left leg pain, Fall (on) (from) other stairs and steps, initial encounter





Condition: Stable


Disposition: HOME, SELF-CARE


Additional Instructions: 


Leg Pain, Nonspecific





     We did not find an obvious cause for your leg pain. There's no sign of 

blood clot, infection, or other serious disease.


     Possible causes of vague leg pain include muscle or joint inflammation, 

disc disease in the lower back, pressure on the nerves in the back, or reduced 

blood flow through the arteries of the leg.


     Rest the leg. Pain can be eased with an antiinflammatory pain medicine 

such as ibuprofen. If the pain involves a small area, a heating pad might help. 


     Call the doctor or return if the leg becomes swollen, weak, discolored, or 

increasingly painful, or if you develop any other significant change in your 

health.





Continue taking your meloxicam that has been prescribed to you by Dr. Pearson for 

your left leg pain.





You states you have fallen down several times but you have not not hit or head 

and you continue to have pain in the left leg but it is the same pain you have 

been having for over 4 months.  Your x-rays do not show any new acute injuries.

  A written report of the x-rays given to you for follow-up with your primary 

doctor and your orthopedic doctor.  You state that Dr. Pearson is sending  you to 

physical therapy and given you meloxicam for your chronic pain.





Please follow-up with [facility]. [facility] and your [facility] or your continued pain.





FOLLOW-UP CARE:


If you have been referred to a physician for follow-up care, call the physician

s office for an appointment as you were instructed or within the next two days.

  If you experience worsening or a significant change in your symptoms, notify 

the physician immediately or return to the Emergency Department at any time for 

re-evaluation.


Forms:  Elevated Blood Pressure


Referrals: 


MARTITA HUDSON MD [ACTIVE STAFF] - Follow up as needed

## 2018-01-11 NOTE — RADIOLOGY REPORT (SQ)
EXAM DESCRIPTION:  TIBIA FIBULA LEFT



COMPLETED DATE/TIME:  1/11/2018 4:19 pm



REASON FOR STUDY:  fall, leg pain



COMPARISON:  None.



NUMBER OF VIEWS:  Two views.



TECHNIQUE:  Two radiographic images acquired of the left tibia and fibula to include the knee and ank
le in at least one projection.



LIMITATIONS:  None.



FINDINGS:  MINERALIZATION: Normal.

BONES: No acute fracture or dislocation.  No worrisome bone lesions.

SOFT TISSUES: No obvious swelling or foreign body.

OTHER: No other significant finding.



IMPRESSION:  NEGATIVE STUDY OF THE LEFT TIBIA AND FIBULA. NO RADIOGRAPHIC EVIDENCE OF ACUTE INJURY.



TECHNICAL DOCUMENTATION:  JOB ID:  [id number]

 2011 [facility]- All Rights Reserved

## 2018-01-11 NOTE — RADIOLOGY REPORT (SQ)
EXAM DESCRIPTION:  FEMUR LEFT



COMPLETED DATE/TIME:  1/11/2018 4:19 pm



REASON FOR STUDY:  fall, leg pain



COMPARISON:  None.



NUMBER OF VIEWS:  Two views.



TECHNIQUE:  Two radiographic images acquired of the left femur to include hip and knee in at least on
e projection.



LIMITATIONS:  None.



FINDINGS:  MINERALIZATION: Normal.

BONES: No acute fracture.  No worrisome bone lesions.

SOFT TISSUES: No obvious swelling or foreign body.

OTHER: Patellar spurring is identified.



IMPRESSION:  NEGATIVE STUDY OF THE LEFT FEMUR. NO RADIOGRAPHIC EVIDENCE OF ACUTE INJURY.



TECHNICAL DOCUMENTATION:  JOB ID:  [id number]

 2011 [employer]- All Rights Reserved

## 2018-01-11 NOTE — ER DOCUMENT REPORT
ED Medical Screen (RME)





- General


Chief Complaint: Fall


Stated Complaint: FALL/LEFT SIDE PAIN


Time Seen by Provider: 01/11/18 15:50


Notes: 


Pt is a 61 yo male who tripped down stairs and landed on his left leg. He is 

complaining of left shin and thigh pain. Did not hit his head. Has a history of 

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





PE: Tenderness over left distal upper leg and mid-shin. No signs of trauma.





I have greeted and performed a rapid initial assessment of this patient.  A 

comprehensive ED assessment and evaluation of the patient, analysis of test 

results and completion of the medical decision making process will be conducted 

by additional ED providers.


TRAVEL OUTSIDE OF THE [place] IN LAST 30 DAYS: No





- Related Data


Allergies/Adverse Reactions: 


 





No Known Allergies Allergy (Verified 01/11/18 13:47)


 











Past Medical History





- Social History


Frequency of alcohol use: None


Drug Abuse: None





- Past Medical History


Cardiac Medical History: Reports: Hx Hypercholesterolemia, Hx Hypertension


Neurological Medical History: Reports: Hx Cerebrovascular Accident - 2012, Hx 

Seizures - childhood


Renal/ Medical History: Denies: Hx Peritoneal Dialysis


Musculoskeltal Medical History: Reports Hx Arthritis, Reports Hx Muscle 

Weakness - Left





- Immunizations


Hx Diphtheria, Pertussis, Tetanus Vaccination: Yes





Physical Exam





- Vital signs


Vitals: 





 











Temp Pulse Resp BP Pulse Ox


 


 98.4 F   82   14   161/99 H  97 


 


 01/11/18 14:07  01/11/18 14:07  01/11/18 14:07  01/11/18 14:07  01/11/18 14:07














Course





- Vital Signs


Vital signs: 





 











Temp Pulse Resp BP Pulse Ox


 


 98.4 F   82   14   161/99 H  97 


 


 01/11/18 14:07  01/11/18 14:07  01/11/18 14:07  01/11/18 14:07  01/11/18 14:07

## 2018-08-17 ENCOUNTER — HOSPITAL ENCOUNTER (EMERGENCY)
Dept: HOSPITAL 62 - ER | Age: 63
Discharge: HOME | End: 2018-08-17
Payer: OTHER GOVERNMENT

## 2018-08-17 VITALS — DIASTOLIC BLOOD PRESSURE: 110 MMHG | SYSTOLIC BLOOD PRESSURE: 177 MMHG

## 2018-08-17 DIAGNOSIS — Z79.899: ICD-10-CM

## 2018-08-17 DIAGNOSIS — I10: ICD-10-CM

## 2018-08-17 DIAGNOSIS — M25.521: Primary | ICD-10-CM

## 2018-08-17 DIAGNOSIS — M70.21: ICD-10-CM

## 2018-08-17 DIAGNOSIS — I69.354: ICD-10-CM

## 2018-08-17 PROCEDURE — 99283 EMERGENCY DEPT VISIT LOW MDM: CPT

## 2018-08-17 NOTE — ER DOCUMENT REPORT
HPI





- HPI


Patient complains to provider of: Right elbow pain


Onset: Other - 2 days


Onset/Duration: Persistent


Quality of pain: Achy


Pain Level: 5


Context: 





Patient presents complaining of right elbow tenderness for the past 2 days with 

some swelling.  Patient denies any injury.  Patient denies any fever.


Associated Symptoms: Other - Right elbow pain


Exacerbated by: Movement


Relieved by: Denies


Similar symptoms previously: No


Recently seen / treated by doctor: No





- ROS


ROS below otherwise negative: Yes


Systems Reviewed and Negative: Yes All other systems reviewed and negative





- CONSTITUTIONAL


Constitutional: DENIES: Fever





- NEURO


Neurology: DENIES: Weakness





- MUSCULOSKELETAL


Musculoskeletal: REPORTS: Extremity pain, Swelling





- DERM


Skin Color: Normal


Skin Problems: None





Past Medical History





- General


Information source: Patient





- Social History


Smoking Status: Never Smoker


Chew tobacco use (# tins/day): No


Frequency of alcohol use: None


Drug Abuse: None


Lives with: Family


Family History: Arthritis, DM, Hyperlipidemia, Hypertension


Patient has suicidal ideation: No


Patient has homicidal ideation: No





- Past Medical History


Cardiac Medical History: Reports: Hx Hypercholesterolemia, Hx Hypertension


Neurological Medical History: Reports: Hx Cerebrovascular Accident - 2012, Hx 

Seizures - childhood


Renal/ Medical History: Denies: Hx Peritoneal Dialysis


Musculoskeletal Medical History: Reports Hx Arthritis, Reports Hx Muscle 

Weakness - Left


Surgical Hx: Negative





- Immunizations


Hx Diphtheria, Pertussis, Tetanus Vaccination: Yes





Vertical Provider Document





- CONSTITUTIONAL


Agree With Documented VS: Yes


Exam Limitations: No Limitations


General Appearance: WD/WN, No Apparent Distress





- INFECTION CONTROL


TRAVEL OUTSIDE OF THE [place] IN LAST 30 DAYS: No





- HEENT


HEENT: Atraumatic, Normocephalic





- NECK


Neck: Normal Inspection, Supple





- RESPIRATORY


Respiratory: Breath Sounds Normal, No Respiratory Distress





- MUSCULOSKELETAL/EXTREMETIES


Musculoskeletal/Extremeties: MAEW - Moves right upper extremity well, Tender - 

Right elbow tenderness over olecranon bursa





- NEURO


Level of Consciousness: Awake, Alert, Appropriate


Motor/Sensory: No Motor Deficit





- DERM


Integumentary: Warm, Dry, No Rash





Course





- Re-evaluation


Re-evalutation: 





08/17/18 09:09


Patient has a history of prior CVA with left-sided hemiparesis.  Patient 

compensates by putting weight on his right elbow to help reposition.  Patient 

does acknowledge rubbing his right elbow recently on hard surfaces.  Patient 

able to fully flex and extend elbow.  No erythema overlying joint, minimal 

swelling over olecranon bursa.  No concern for septic arthritis at this time.  

No concern for cellulitis.





Consulted with Dr. Puga regarding patient's management, agrees with plan of 

care.





Patient with a history of hypertension, patient just took his antihypertensive 

medications just prior to arrival in the ER.








- Vital Signs


Vital signs: 


 











Temp Pulse Resp BP Pulse Ox


 


 98.6 F   78   16   190/58 H  95 


 


 08/17/18 08:21  08/17/18 08:21  08/17/18 08:21  08/17/18 08:21  08/17/18 08:21














Procedures





- Immobilization


  ** Right Elbow


Pre-Proc Neuro Vasc Exam: Normal


Immobilizer type: Ace wrap


Performed by: PCT


Post-Proc Neuro Vasc Exam: Normal


Alignment checked and good: Yes





Discharge





- Discharge


Clinical Impression: 


 Olecranon bursitis of right elbow





Condition: Stable


Disposition: HOME, SELF-CARE


Instructions:  Ace Wrap ([facility]), Olecranon Bursitis ([facility]), Steroid Medication


Additional Instructions: 


Return immediately for any new or worsening symptoms





Followup with your primary care provider, call tomorrow to make a followup 

appointment





Avoid putting pressure to right elbow








Prescriptions: 


Diclofenac Sodium [Voltaren] 1 applic TP QID PRN #100 gel..gm.


 PRN Reason: 


Tramadol HCl [Ultram 50 mg Tablet] 50 mg PO ASDIR PRN #15 tablet


 PRN Reason: 


Referrals: 


SHEILA MUSA MD [Primary Care Provider] - Follow up tomorrow

## 2018-10-24 ENCOUNTER — HOSPITAL ENCOUNTER (EMERGENCY)
Dept: HOSPITAL 62 - ER | Age: 63
Discharge: HOME | End: 2018-10-24
Payer: OTHER GOVERNMENT

## 2018-10-24 VITALS — SYSTOLIC BLOOD PRESSURE: 163 MMHG | DIASTOLIC BLOOD PRESSURE: 81 MMHG

## 2018-10-24 DIAGNOSIS — F17.200: ICD-10-CM

## 2018-10-24 DIAGNOSIS — I10: ICD-10-CM

## 2018-10-24 DIAGNOSIS — M79.605: ICD-10-CM

## 2018-10-24 DIAGNOSIS — M25.552: Primary | ICD-10-CM

## 2018-10-24 DIAGNOSIS — G89.29: ICD-10-CM

## 2018-10-24 PROCEDURE — 99283 EMERGENCY DEPT VISIT LOW MDM: CPT

## 2018-10-24 NOTE — ER DOCUMENT REPORT
ED General





- General


Chief Complaint: Hip Pain


Stated Complaint: HIP PAIN


Time Seen by Provider: 10/24/18 10:22


TRAVEL OUTSIDE OF THE [place] IN LAST 30 DAYS: No





- HPI


Patient complains to provider of: Left hip pain


Notes: 





Patient coming in complaining of left hip pain ongoing for the last 2 years 

however worse over the last 2 weeks.  Patient denies any trauma denies any 

recent falls denies any fever chills nausea vomiting diarrhea.  Patient is seen 

in a wheelchair states normally walks around with a cane.  Patient states pain 

in left hip radiating down the back of the left leg to his toes.  Patient 

states he [facility] however also has Medicaid does not have a PCP.  

Patient is requesting pain control and to "fix me".





- Related Data


Allergies/Adverse Reactions: 


 





No Known Allergies Allergy (Verified 10/24/18 09:46)


 











Past Medical History





- Social History


Smoking Status: Current Every Day Smoker


Frequency of alcohol use: None


Drug Abuse: None


Family History: Arthritis, DM, Hyperlipidemia, Hypertension


Patient has suicidal ideation: No


Patient has homicidal ideation: No





- Past Medical History


Cardiac Medical History: Reports: Hx Hypercholesterolemia, Hx Hypertension


Neurological Medical History: Reports: Hx Cerebrovascular Accident - 2012, Hx 

Seizures - childhood


Renal/ Medical History: Denies: Hx Peritoneal Dialysis


Musculoskeletal Medical History: Reports Hx Arthritis, Reports Hx Muscle 

Weakness - Left





- Immunizations


Hx Diphtheria, Pertussis, Tetanus Vaccination: Yes





Review of Systems





- Review of Systems


Constitutional: No symptoms reported


EENT: No symptoms reported


Cardiovascular: No symptoms reported


Respiratory: No symptoms reported


Gastrointestinal: No symptoms reported


Genitourinary: No symptoms reported


Male Genitourinary: No symptoms reported


Musculoskeletal: Other - Left hip pain


Skin: No symptoms reported


Hematologic/Lymphatic: No symptoms reported


Neurological/Psychological: No symptoms reported





Physical Exam





- Vital signs


Vitals: 


 











Temp Pulse Resp BP Pulse Ox


 


 99.5 F   100   20   163/81 H  100 


 


 10/24/18 09:52  10/24/18 09:52  10/24/18 09:52  10/24/18 09:52  10/24/18 09:52











Interpretation: Normal





- General


General appearance: Appears well, Alert





- HEENT


Head: Normocephalic, Atraumatic


Eyes: Normal


Pupils: PERRL





- Respiratory


Respiratory status: No respiratory distress


Chest status: Nontender


Breath sounds: Normal


Chest palpation: Normal





- Cardiovascular


Rhythm: Regular


Heart sounds: Normal auscultation


Murmur: No





- Abdominal


Inspection: Normal


Distension: No distension


Bowel sounds: Normal


Tenderness: Nontender


Organomegaly: No organomegaly





- Back


Back: Normal, Nontender





- Extremities


General upper extremity: Normal inspection, Nontender, Normal color, Normal ROM

, Normal temperature


General lower extremity: Normal inspection, Nontender - No tenderness is 

elicited upon palpation bilateral hips upper thighs bilateral knees.  There is 

no guarding or painful response to palpation palpation of the patient's 

buttocks also reveals no guarding however patient states upon the area on the 

left side palpate around the piriformis patient states pain reproducible down 

the leg.  Patient is able to lift himself up in his wheelchair so that I can 

push on his buttocks with his lower extremities, Normal color, Normal ROM, 

Normal temperature.  No: Frank's sign





- Neurological


Neuro grossly intact: Yes


Cognition: Normal


Orientation: AAOx4


Sebastián Coma Scale Eye Opening: Spontaneous


Sparkill Coma Scale Verbal: Oriented


Sparkill Coma Scale Motor: Obeys Commands


Sebastián Coma Scale Total: 15


Speech: Normal


Motor strength normal: LUE, RUE, LLE, RLE


Sensory: Normal


Knee - Reflex grade: 2 = Normal





- Psychological


Associated symptoms: Normal affect, Normal mood





- Skin


Skin Temperature: Warm


Skin Moisture: Dry


Skin Color: Normal





Course





- Re-evaluation


Re-evalutation: 





10/24/18 10:33


Long discussion with the patient that at this time he has a chronic condition 

and that his physical examination is consistent with sciatica did offer x-rays 

however explained to the patient more likely this will only show diffuse 

arthritic changes that the patient has no trauma and pain is been ongoing for 

the last 2 weeks and not concerning at this time for any signs of fracture.  

Patient states he does want to feel better with the pain to go away.  Explained 

to the patient that he will need to be established with a primary care 

physician so that he can follow-up more likely he will need to undergo physical 

therapy again along with some other further imaging modalities.  Patient states 

understanding.  Patient will be treated with Tylenol Motrin prednisone and oral 

narcotics.  We will try to contact our  for at least the patient's 

information to our  on call to help the patient follow-up with 

your primary care physician.





- Vital Signs


Vital signs: 


 











Temp Pulse Resp BP Pulse Ox


 


 99.5 F   100   20   163/81 H  100 


 


 10/24/18 09:52  10/24/18 09:52  10/24/18 09:52  10/24/18 09:52  10/24/18 09:52














Discharge





- Discharge


Clinical Impression: 


 Exacerbation of chronic left hip pain





Sciatica


Qualifiers:


 Laterality: left Qualified Code(s): M54.32 - Sciatica, left side





Condition: Good


Disposition: HOME, SELF-CARE


Instructions:  Arthralgia ([facility]), Family Physicians / Practices, Ice Packs ([facility])

, Oral Narcotic Medication ([facility]), Sciatica ([facility]), Warm Packs ([facility])


Additional Instructions: 


Your physical evaluation today is consistent with sciatica.  It is very 

important that you follow-up with a primary care physician for your chronic hip 

pain.  I recommend establishing yourself with 1 of the primary care physicians  

listed.  I will give your information to 1 of our social workers that they can 

follow-up with you to assist you in following up with your primary care 

physician.





Take medications as prescribed.  Return to ER symptoms worsen.


Prescriptions: 


Ibuprofen [Motrin 600 mg Tablet] 600 mg PO Q8HP PRN #21 tablet


 PRN Reason: 


Prednisone [Deltasone 20 mg Tablet] 3 tab PO DAILY 4 Days  tablet


Tramadol HCl [Ultram 50 mg Tablet] 50 mg PO ASDIR PRN #30 tablet


 PRN Reason: 


Referrals: 


SHEILA MUSA MD [Primary Care Provider] - Follow up as needed

## 2019-01-15 ENCOUNTER — HOSPITAL ENCOUNTER (EMERGENCY)
Dept: HOSPITAL 62 - ER | Age: 64
Discharge: HOME | End: 2019-01-15
Payer: OTHER GOVERNMENT

## 2019-01-15 VITALS — DIASTOLIC BLOOD PRESSURE: 76 MMHG | SYSTOLIC BLOOD PRESSURE: 120 MMHG

## 2019-01-15 DIAGNOSIS — F17.200: ICD-10-CM

## 2019-01-15 DIAGNOSIS — M10.9: Primary | ICD-10-CM

## 2019-01-15 DIAGNOSIS — I10: ICD-10-CM

## 2019-01-15 DIAGNOSIS — M19.90: ICD-10-CM

## 2019-01-15 DIAGNOSIS — M25.562: ICD-10-CM

## 2019-01-15 PROCEDURE — 96372 THER/PROPH/DIAG INJ SC/IM: CPT

## 2019-01-15 PROCEDURE — 99283 EMERGENCY DEPT VISIT LOW MDM: CPT

## 2019-01-15 PROCEDURE — 73564 X-RAY EXAM KNEE 4 OR MORE: CPT

## 2019-01-15 NOTE — ER DOCUMENT REPORT
HPI





- HPI


Patient complains to provider of: Left knee pain


Time Seen by Provider: 01/15/19 10:18


Onset/Duration: Persistent


Quality of pain: Achy


Pain Level: 5


Context: 





Patient presents complaining of left knee pain for the past 2 weeks.  Patient 

complains of pain to the anterior aspect of the joint.  Patient denies any 

injury.  Patient states pain was worse today which prompted his visit.  Patient 

denies any known history of gout.


Associated Symptoms: Other - Left knee pain.  denies: Fever


Exacerbated by: Movement


Relieved by: Denies


Similar symptoms previously: No


Recently seen / treated by doctor: Yes





- ROS


ROS below otherwise negative: Yes


Systems Reviewed and Negative: Yes All other systems reviewed and negative





- CONSTITUTIONAL


Constitutional: DENIES: Fever





- NEURO


Neurology: DENIES: Weakness





- MUSCULOSKELETAL


Musculoskeletal: REPORTS: Extremity pain - L knee.  DENIES: Swelling





- DERM


Skin Color: Erythema


Skin Problems: None





Past Medical History





- General


Information source: Patient





- Social History


Smoking Status: Current Every Day Smoker


Chew tobacco use (# tins/day): No


Smoking Education Provided: Yes


Frequency of alcohol use: None


Drug Abuse: None


Lives with: Spouse/Significant other


Family History: Arthritis, DM, Hyperlipidemia, Hypertension


Patient has suicidal ideation: No


Patient has homicidal ideation: No





- Past Medical History


Cardiac Medical History: Reports: Hx Hypercholesterolemia, Hx Hypertension


Neurological Medical History: Reports: Hx Cerebrovascular Accident - 2012, Hx 

Seizures - childhood


Renal/ Medical History: Denies: Hx Peritoneal Dialysis


Musculoskeletal Medical History: Reports Hx Arthritis, Reports Hx Muscle 

Weakness - Left


Surgical Hx: Negative





- Immunizations


Hx Diphtheria, Pertussis, Tetanus Vaccination: Yes





Vertical Provider Document





- CONSTITUTIONAL


Agree With Documented VS: Yes


Exam Limitations: No Limitations


General Appearance: WD/WN, No Apparent Distress





- INFECTION CONTROL


TRAVEL OUTSIDE OF THE [place] IN LAST 30 DAYS: No





- HEENT


HEENT: Atraumatic, Normocephalic





- NECK


Neck: Normal Inspection, Supple





- RESPIRATORY


Respiratory: Breath Sounds Normal, No Respiratory Distress





- CARDIOVASCULAR


Cardiovascular: Regular Rate, Regular Rhythm


Pulses: Normal: Posterior tibial





- BACK


Back: Normal Inspection





- MUSCULOSKELETAL/EXTREMETIES


Musculoskeletal/Extremeties: MAEW, FROM, Tender - Tenderness to superficial area

overlying patella, mild erythema.  No joint effusion.  Tenderness with palpation

of the joint.





- NEURO


Level of Consciousness: Awake, Alert, Appropriate


Motor/Sensory: No Motor Deficit





- DERM


Integumentary: Warm, Dry





Course





- Re-evaluation


Re-evalutation: 





01/15/19 10:32


Dr. Puga to bedside for examination.  Recommends obtaining x-ray, agrees 

with plan to administer colchicine at this time.  Suspects likely inflammatory 

process, possibly gout.


01/15/19 11:44


X-ray reviewed, no obvious fracture or acute findings.  We will treat 

symptomatically at this time and encourage orthopedic follow-up.


01/15/19 11:44


Patient does report market pain improvement after medications that have been 

given while here in the ER





- Vital Signs


Vital signs: 


                                        











Temp Pulse Resp BP Pulse Ox


 


 98.8 F   93   20   117/75   98 


 


 01/15/19 10:06  01/15/19 10:06  01/15/19 10:06  01/15/19 10:06  01/15/19 10:06














- Diagnostic Test


Radiology reviewed: Pending, Image reviewed





Discharge





- Discharge


Clinical Impression: 


 Arthritis





Left knee pain


Qualifiers:


 Chronicity: acute Qualified Code(s): M25.562 - Pain in left knee





Gout


Qualifiers:


 Gout site: knee Gout etiology: unspecified cause Chronicity: acute Laterality: 

left Qualified Code(s): M10.9 - Gout, unspecified





Condition: Stable


Disposition: HOME, SELF-CARE


Instructions:  Antihistamines ([facility]), Gout ([facility]), Gout Diet ([facility])


Additional Instructions: 


Return immediately for any new or worsening symptoms





Followup with your primary care provider, call tomorrow to make a followup 

appointment





Follow-up with orthopedics for further evaluation, call for follow-up.


Prescriptions: 


Indomethacin [Indocin 25 Mg Capsule] 25 mg PO TID PRN #12 capsule


 PRN Reason: 


Forms:  Smoking Cessation Education


Referrals: 


SHEILA MUSA MD [Primary Care Provider] - Follow up as needed


[facility] [Provider Group] - Follow up as needed

## 2019-01-15 NOTE — RADIOLOGY REPORT (SQ)
EXAM DESCRIPTION:  KNEE LEFT 4 VIEW



COMPLETED DATE/TIME:  1/15/2019 11:03 am



REASON FOR STUDY:  left knee pain



COMPARISON:  7/28/2017



NUMBER OF VIEWS:  Four views.



TECHNIQUE:  AP, lateral, and both oblique radiographic images acquired of the left knee.



LIMITATIONS:  None.



FINDINGS:  MINERALIZATION: Normal.

BONES: No acute findings.  Old fracture proximal fibula.

JOINT: No effusion.

SOFT TISSUES: No soft tissue swelling.  No radio-opaque foreign body.

OTHER: No other significant finding.



IMPRESSION:  NO RADIOGRAPHIC EVIDENCE OF ACUTE INJURY.



TECHNICAL DOCUMENTATION:  JOB ID:  [id number]

 2011 [employer]- All Rights Reserved



Reading location - IP/workstation name: [facility]-RR[facility]

## 2019-02-20 ENCOUNTER — HOSPITAL ENCOUNTER (OUTPATIENT)
Dept: HOSPITAL 62 - SP | Age: 64
End: 2019-02-20
Attending: FAMILY MEDICINE
Payer: MEDICARE

## 2019-02-20 DIAGNOSIS — I70.212: Primary | ICD-10-CM

## 2019-02-20 PROCEDURE — 93925 LOWER EXTREMITY STUDY: CPT

## 2019-02-21 NOTE — XCELERA REPORT
[address] [address]

                               Tel: [phone]

                               Fax: [phone]



                      Lower Extremity Arterial Evaluation

_______________________________________________________________________________



Name: ANGEL PATEL

MRN: [id number]                                       Age: 63 yrs

Gender: Male                                          : 1955

Patient Status: Outpatient                            Patient Location: [facility]

Account #: [id number]

Study Date: 2019 12:52 PM

                          Accession #: [id number]

_______________________________________________________________________________

Procedure: A color flow and duplex scan of the lower extremity arteries was

performed bilaterally with velocity and waveform anaylsis.

Reason For Study: CLADICATION







Ordering Physician: JARET LINDSEY

Performed By: Ceci Vasquez

_______________________________________________________________________________

_______________________________________________________________________________





Measurements and Calculations



                                     Right  Left

                     CFA PSV         163.3  70.3 cm/sec

                     Prox PFA PSV   -167.9 -28.9 cm/sec

                     Prox SFA PSV    -93.7  62.9 cm/sec

                     Mid SFA PSV    -100.6  40.9 cm/sec

                     Dist SFA PSV   -121.9 -46.5 cm/sec

                     Prox Pop A PSV  -53.3       cm/sec

                     Dist Pop A PSV        -32.1 cm/sec

                     Mid SYLVIE PSV            14.9 cm/sec

                     Dist SYLVIE PSV    12.2        cm/sec

                     Mid PTA PSV     36.0   17.9 cm/sec

                     Eddie Pedis PSV   20.2   17.7 cm/sec



_______________________________________________________________________________

Right Side Arterial Evaluation

Normal velocity and triphasic waveforms noted in the Common Femoral artery.

Biphasic with low normal to low velocity from Popliteal to the infrageniculate

vessels .

Ankle Brachial index not obtained .



Left Side Arterial Evaluation

Normal velocity and triphasic waveforms noted in the Common Femoral artery.

Biphasic with low normal velocity in the Deep Femoral. Monophasic with low

velocity from Popliteal to the infrageniculate vessels .

Ankle Brachial index not obtained .





Critical Findings

Discussed with Dr Lindsey.

_______________________________________________________________________________

Interpretation Summary

Moderate hemodynamically significant lesions in the right lower extremity

only, on duplex imaging, at rest. Severe hemodynamically significant lesions

in the left lower extremity only, on duplex imaging, at rest. Significant

disease at about the Femoral level , bilaterally, much more severe on the

left.

_______________________________________________________________________________



Electronically signed by:      Lennox Williams      on 2019 07:52 AM



CC: JARET LINDSEY

>

Williams, Lennox

## 2019-03-09 ENCOUNTER — HOSPITAL ENCOUNTER (EMERGENCY)
Dept: HOSPITAL 62 - ER | Age: 64
Discharge: HOME | End: 2019-03-09
Payer: MEDICARE

## 2019-03-09 VITALS — DIASTOLIC BLOOD PRESSURE: 84 MMHG | SYSTOLIC BLOOD PRESSURE: 139 MMHG

## 2019-03-09 DIAGNOSIS — I10: ICD-10-CM

## 2019-03-09 DIAGNOSIS — M79.672: ICD-10-CM

## 2019-03-09 DIAGNOSIS — M10.9: Primary | ICD-10-CM

## 2019-03-09 PROCEDURE — 99283 EMERGENCY DEPT VISIT LOW MDM: CPT

## 2019-03-09 NOTE — RADIOLOGY REPORT (SQ)
EXAM DESCRIPTION:  FOOT LEFT COMPLETE



COMPLETED DATE/TIME:  3/9/2019 12:52 pm



REASON FOR STUDY:  pain mid foot   .  Twisted foot on Wednesday.



COMPARISON:  Left foot x-ray 8/27/2012.



NUMBER OF VIEWS:  Three views.



TECHNIQUE:  AP, lateral and oblique  radiographic images acquired of the left foot.



LIMITATIONS:  None.



FINDINGS:  MINERALIZATION: Normal.

BONES: There is a linear sclerosis at the cuboid, suggestive of a stress fracture.  Redemonstration o
f the 2 mm cortical density superior to the anterior aspect of the talus, may be secondary to prior t
rauma.

SOFT TISSUES: No soft tissue swelling.  No radiopaque foreign body.



IMPRESSION:  Linear sclerosis at the cuboid, suggestive of a stress fracture.



TECHNICAL DOCUMENTATION:  JOB ID:  [id number]

[id number]

 2011 [employer]- All Rights Reserved



Reading location - IP/workstation name: [facility]

## 2019-03-09 NOTE — ER DOCUMENT REPORT
ED General





- General


Chief Complaint: Foot Pain


Stated Complaint: LEFT ANKLE/FOOT PAIN


Time Seen by Provider: 03/09/19 12:38


Primary Care Provider: 


JARET PULIDO MD [ACTIVE STAFF] - Follow up in 3-5 days


COTY BONILLA MD [ACTIVE STAFF] - Follow up as needed


TRAVEL OUTSIDE OF THE [place] IN LAST 30 DAYS: No





- HPI


Patient complains to provider of: Left foot pain


Notes: 





Patient with a history of gout coming in for left foot pain.  Patient states 

started on Wednesday had increased swelling to the dorsum of the foot.  Patient 

states pain with ambulation and movement of the foot.  Patient denies any 

numbness or tingling denies any trauma.  Patient denies fevers chills nausea 

vomiting otherwise resting comfortably upon my evaluation.





- Related Data


Allergies/Adverse Reactions: 


                                        





No Known Allergies Allergy (Verified 03/09/19 11:59)


   











Past Medical History





- Social History


Smoking Status: Unknown if Ever Smoked


Family History: Arthritis, DM, Hyperlipidemia, Hypertension





- Past Medical History


Cardiac Medical History: Reports: Hx Hypercholesterolemia, Hx Hypertension


Neurological Medical History: Reports: Hx Cerebrovascular Accident - 2012, Hx 

Seizures - childhood


Renal/ Medical History: Denies: Hx Peritoneal Dialysis


Musculoskeletal Medical History: Reports Hx Arthritis, Reports Hx Muscle Weaknes

s - Left





- Immunizations


Hx Diphtheria, Pertussis, Tetanus Vaccination: Yes





Review of Systems





- Review of Systems


Constitutional: No symptoms reported


EENT: No symptoms reported


Cardiovascular: No symptoms reported


Respiratory: No symptoms reported


Gastrointestinal: No symptoms reported


Genitourinary: No symptoms reported


Male Genitourinary: No symptoms reported


Musculoskeletal: Other - Foot pain


Skin: No symptoms reported


Hematologic/Lymphatic: No symptoms reported


Neurological/Psychological: No symptoms reported





Physical Exam





- Vital signs


Vitals: 


                                        











Temp Pulse Resp BP Pulse Ox


 


 98.4 F   82   20   132/79 H  100 


 


 03/09/19 12:03  03/09/19 12:03  03/09/19 12:03  03/09/19 12:03  03/09/19 12:03











Interpretation: Normal





- General


General appearance: Appears well, Alert





- HEENT


Head: Normocephalic, Atraumatic


Eyes: Normal


Pupils: PERRL





- Respiratory


Respiratory status: No respiratory distress


Chest status: Nontender


Breath sounds: Normal


Chest palpation: Normal





- Cardiovascular


Rhythm: Regular


Heart sounds: Normal auscultation


Murmur: No





- Abdominal


Inspection: Normal


Distension: No distension


Bowel sounds: Normal


Tenderness: Nontender


Organomegaly: No organomegaly





- Back


Back: Normal, Nontender





- Extremities


General upper extremity: Normal inspection, Nontender, Normal color, Normal ROM,

Normal temperature


General lower extremity: Normal color, Normal ROM, Normal temperature, Normal 

weight bearing, Other - Right foot unaffected patient has swelling to the dorsum

of the left foot exactly in the midfoot bilaterally or the cuboid bone area.  

There is no tenderness to palpation of the plantar surfaces range of motion of 

the toes is intact.  Range of motion of the ankle is intact Refill is intact 

distally..  No: Frank's sign





- Neurological


Neuro grossly intact: Yes


Cognition: Normal


Orientation: AAOx4


Sebastián Coma Scale Eye Opening: Spontaneous


Sebastián Coma Scale Verbal: Oriented


Baldwin Coma Scale Motor: Obeys Commands


Baldwin Coma Scale Total: 15


Speech: Normal


Motor strength normal: LUE, RUE, LLE, RLE


Sensory: Normal





- Psychological


Associated symptoms: Normal affect, Normal mood





- Skin


Skin Temperature: Warm


Skin Moisture: Dry


Skin Color: Normal





Course





- Re-evaluation


Re-evalutation: 





03/09/19 13:46


X-ray shows possible stress fracture.  Patient was placed in a postop shoe pain 

medication was given to the patient.  Patient was also given gout diet 

instructions per his history of gout otherwise at this time for is not warm 

early consistent with a gout flare did recommend patient follow-up with 

orthopedics as primary care physician.





- Vital Signs


Vital signs: 


                                        











Temp Pulse Resp BP Pulse Ox


 


 98.4 F   82   20   132/79 H  100 


 


 03/09/19 12:03  03/09/19 12:03  03/09/19 12:03  03/09/19 12:03  03/09/19 12:03














Discharge





- Discharge


Clinical Impression: 


Gout of foot


Qualifiers:


 Gout etiology: unspecified cause Chronicity: acute Laterality: left Qualified 

Code(s): M10.9 - Gout, unspecified





Condition: Good


Disposition: HOME, SELF-CARE


Instructions:  Fracture ([facility]), Gout ([facility]), Gout Diet ([facility]), Oral Narcotic 

Medication ([facility]), Post-Op Shoe ([facility])


Additional Instructions: 


Your x-ray shows the beginning of a possible stress fracture.  We will place you

in a postop shoe I would highly recommend she follow-up with the orthopedic 

doctor provided.  Return to ER symptoms worsen.  I would highly recommend 

following the dietary recommendations for the gout diet as that she may have 

underlying component of gout causing the  foot pain.


Prescriptions: 


Ibuprofen [Motrin 600 mg Tablet] 600 mg PO Q8HP PRN #21 tablet


 PRN Reason: 


Tramadol HCl [Ultram 50 mg Tablet] 50 mg PO ASDIR PRN #20 tablet


 PRN Reason: 


Referrals: 


JARET PULIDO MD [ACTIVE STAFF] - Follow up in 3-5 days


COTY BONILLA MD [ACTIVE STAFF] - Follow up as needed

## 2020-03-23 ENCOUNTER — HOSPITAL ENCOUNTER (OUTPATIENT)
Dept: HOSPITAL 62 - RAD | Age: 65
End: 2020-03-23
Attending: INTERNAL MEDICINE
Payer: OTHER GOVERNMENT

## 2020-03-23 DIAGNOSIS — R05: ICD-10-CM

## 2020-03-23 DIAGNOSIS — Z72.0: Primary | ICD-10-CM

## 2020-03-23 PROCEDURE — 71250 CT THORAX DX C-: CPT

## 2020-03-23 NOTE — RADIOLOGY REPORT (SQ)
EXAM DESCRIPTION:  CT CHEST WITHOUT



COMPLETED DATE/TIME:  3/23/2020 1:56 pm



REASON FOR STUDY:  (Z72.0)TOBACCO USE; (R05)COUGH Z72.0  TOBACCO USE R05  COUGH



COMPARISON:  None.



TECHNIQUE:  CT scan performed of the chest without intravenous contrast.  Images reviewed with lung, 
soft tissue and bone windows.  Reconstructed coronal and sagittal MPR images reviewed.  All images st
ored on PACS.

All CT scanners at this facility use dose modulation, iterative reconstruction, and/or weight based d
osing when appropriate to reduce radiation dose to as low as reasonably achievable (ALARA).

[facility]: Dose Right  [facility]: CareDose    [facility]: Dose Right    [facility]: Teradose 4D    [facility]: Smart Technologies



RADIATION DOSE:  CT Rad equipment meets quality standard of care and radiation dose reduction techniq
ues were employed. CTDIvol: 15.6 mGy. DLP: 616 mGy-cm. mGy.



LIMITATIONS:  Positioning.  Motion artifact.



FINDINGS:  LUNGS AND PLEURA: Mild paraseptal emphysema upper lobes.  No masses, infiltrates, or pneum
othorax.  No pleural effusions or pleural calcifications.

HILAR AND MEDIASTINAL STRUCTURES: No identified masses or abnormal nodes.  No obvious aneurysm.

HEART AND VASCULAR STRUCTURES: No aneurysm.  No pericardial effusion.

UPPER ABDOMEN: No significant findings.  Limited exam.

THYROID AND OTHER SOFT TISSUES: No masses.  No adenopathy.

BONES: No significant finding.

HARDWARE: None in the chest.

OTHER: No other significant findings.



IMPRESSION:  NO SIGNIFICANT FINDING ON NON-CONTRASTED CHEST CT.



TECHNICAL DOCUMENTATION:  JOB ID:  [id number]

Quality ID # [id number]: Final reports with documentation of one or more dose reduction techniques (e.g., Au
tomated exposure control, adjustment of the mA and/or kV according to patient size, use of iterative 
reconstruction technique)

 2011 [employer]- All Rights Reserved



Reading location - IP/workstation name: [email or web address]

## 2020-04-17 ENCOUNTER — HOSPITAL ENCOUNTER (OUTPATIENT)
Dept: HOSPITAL 62 - RAD | Age: 65
End: 2020-04-17
Attending: INTERNAL MEDICINE
Payer: OTHER GOVERNMENT

## 2020-04-17 DIAGNOSIS — R63.4: Primary | ICD-10-CM

## 2020-04-17 DIAGNOSIS — F17.200: ICD-10-CM

## 2020-04-17 DIAGNOSIS — D50.9: ICD-10-CM

## 2020-04-17 DIAGNOSIS — I51.7: ICD-10-CM

## 2020-04-17 PROCEDURE — 74177 CT ABD & PELVIS W/CONTRAST: CPT

## 2020-04-17 NOTE — RADIOLOGY REPORT (SQ)
EXAM DESCRIPTION:  CT ABD/PELVIS WITH IV   ORAL



IMAGES COMPLETED DATE/TIME:  4/17/2020 10:22 am



REASON FOR STUDY:  R63.4 ABNORMAL WEIGHT LOSS, Z72.0 TOBACCO USE, D64.9 ANEMIA, UNSPECIFIED R63.4  AB
NORMAL WEIGHT LOSS Z72.0  TOBACCO USE D64.9  ANEMIA, UNSPECIFIED



COMPARISON:  None.



TECHNIQUE:  CT scan of the abdomen and pelvis performed using helical scanning technique with dynamic
 intravenous contrast injection.  No oral contrast. Images reviewed with lung, soft tissue, and bone 
windows. Reconstructed coronal and sagittal MPR images reviewed. Delayed images for evaluation of the
 urinary system also acquired. All images stored on PACS.

All CT scanners at this facility use dose modulation, iterative reconstruction, and/or weight based d
osing when appropriate to reduce radiation dose to as low as reasonably achievable (ALARA).

[facility]: Dose Right  [facility]: CareDose    [facility]: Dose Right    [facility]: Teradose 4D    [facility]: [employer]



CONTRAST TYPE AND DOSE:  Contrast/concentration: Isovue 350.00 mg/ml; Total Contrast Delivered: 79.0 
ml; Total Saline Delivered: 68.0 ml



RENAL FUNCTION:  Creatinine 1.2 milligrams/deciliter.



RADIATION DOSE:  CT Rad equipment meets quality standard of care and radiation dose reduction techniq
ues were employed. CTDIvol: 8.2 - 8.4 mGy. DLP: 851 mGy-cm..



LIMITATIONS:  None.



FINDINGS:  LOWER CHEST: The left ventricle is enlarged.  There is atherosclerotic calcification of th
e coronary arteries.  The nodularity lateral and posterior to the esophagus is unchanged compared to 
the CT from 3/23/2020.  There is no acute consolidation or pleural effusion.

LIVER: The morphology of the liver is noncirrhotic.  The portal veins are patent.  There is no hepati
c mass.

SPLEEN: No splenomegaly or splenic mass.

PANCREAS: No acute abnormality of the pancreas.

GALLBLADDER: No abnormality that is apparent on CT.

ADRENAL GLANDS:  No mass or asymmetry.

RIGHT KIDNEY AND URETER: The cortical based hypodense lesion in the upper pole of the kidney measures
 less than 1 cm and therefore is too small to characterize.  There is no solid mass, hydronephrosis, 
nephrolithiasis, hydroureter or ureterolithiasis.

LEFT KIDNEY AND URETER: There are several cortical based, hypodense lesions that measure up to 2.6 x 
1.8 cm consistent with cyst.  There is no solid mass, hydronephrosis, nephrolithiasis, hydroureter or
 ureterolithiasis.

AORTA AND VESSELS: Patent fem-to-fem bypass graft.  The abdominal aorta is nonaneurysmal.

RETROPERITONEUM: No retroperitoneal adenopathy, hemorrhage or mass.

BOWEL AND PERITONEAL CAVITY: No bowel obstruction, bowel wall thickening or pericolonic/ perienteric 
inflammation.  No mesenteric adenopathy, free intraperitoneal fluid or mesenteric/ omental inflammati
on.

APPENDIX: Normal.

PELVIS: The prostate gland is enlarged.  The wall of the urinary bladder is thickened.  There is no p
elvic mass.

ABDOMINAL WALL: No mass or hernia

BONES: Grade 1 anterolisthesis of L4 relative to L5.

OTHER: No other finding.



IMPRESSION:  1.  No acute intra-abdominal abnormality.

2.  Thickening of the wall of the urinary bladder.  In the setting of the cardiomegaly the finding co
uld be secondary to chronic bladder outlet obstruction, however correlation with urinalysis is recomm
ended to exclude an acute cystitis.



TECHNICAL DOCUMENTATION:  JOB ID:  [id number]

Quality ID # [id number]: Final reports with documentation of one or more dose reduction techniques (e.g., Au
tomated exposure control, adjustment of the mA and/or kV according to patient size, use of iterative 
reconstruction technique)

 2011 [employer]- All Rights Reserved



Reading location - IP/workstation name: [email or web address]